# Patient Record
Sex: MALE | Race: WHITE | NOT HISPANIC OR LATINO | URBAN - METROPOLITAN AREA
[De-identification: names, ages, dates, MRNs, and addresses within clinical notes are randomized per-mention and may not be internally consistent; named-entity substitution may affect disease eponyms.]

---

## 2017-05-25 ENCOUNTER — EMERGENCY (EMERGENCY)
Facility: HOSPITAL | Age: 32
LOS: 0 days | Discharge: HOME | End: 2017-05-25
Attending: EMERGENCY MEDICINE | Admitting: EMERGENCY MEDICINE
Payer: OTHER MISCELLANEOUS

## 2017-05-25 VITALS
OXYGEN SATURATION: 100 % | HEIGHT: 72 IN | SYSTOLIC BLOOD PRESSURE: 132 MMHG | TEMPERATURE: 99 F | HEART RATE: 61 BPM | DIASTOLIC BLOOD PRESSURE: 72 MMHG | RESPIRATION RATE: 18 BRPM | WEIGHT: 250 LBS

## 2017-05-25 DIAGNOSIS — S00.211A ABRASION OF RIGHT EYELID AND PERIOCULAR AREA, INITIAL ENCOUNTER: ICD-10-CM

## 2017-05-25 DIAGNOSIS — S05.01XA INJURY OF CONJUNCTIVA AND CORNEAL ABRASION WITHOUT FOREIGN BODY, RIGHT EYE, INITIAL ENCOUNTER: ICD-10-CM
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
## 2017-05-25 PROCEDURE — 99282 EMERGENCY DEPT VISIT SF MDM: CPT

## 2017-05-25 RX ORDER — MOXIFLOXACIN HCL 0.5 %
1 DROPS OPHTHALMIC (EYE)
Qty: 1 | Refills: 0 | OUTPATIENT
Start: 2017-05-25 | End: 2017-06-01

## 2017-05-25 NOTE — ED STATDOCS - OBJECTIVE STATEMENT
32 y/o M with no PMHx presents to the ED c/o foreign body in R eye. Pt states that he was at work earlier today underneath truck and felt something fall to his face landing within his eye. Pt states that his eye has become red and now tearing. Pt currently calm, able to give adequate hx and denies any other acute c/o at this time.

## 2017-05-25 NOTE — ED STATDOCS - DETAILS:
I, Payam Mcginnis, performed the initial face to face bedside interview with this patient regarding history of present illness, review of symptoms and relevant past medical, social and family history.  I completed an independent physical examination.  I was the initial provider who evaluated this patient. I have signed out the follow up of any pending tests (i.e. labs, radiological studies) to the ACP.  I have communicated the patient’s plan of care and disposition with the ACP.  The history, relevant review of systems, past medical and surgical history, medical decision making, and physical examination was documented by the scribe in my presence and I attest to the accuracy of the documentation.

## 2017-05-25 NOTE — ED STATDOCS - PHYSICAL EXAMINATION
RIGHT EYE: +Small mid-center corneal abrasion noted right eye. No FB's found. EOMI. Fluorescene dye used. Tetracaine prep applied. Right eyelid lifted to check for FB-none found. RORY Olmedo

## 2017-05-25 NOTE — ED STATDOCS - MEDICAL DECISION MAKING DETAILS
Pt currently calm, able to give adequate hx, c/o R eye pain secondary to foreign body with plans to receive further eye exam.

## 2017-05-25 NOTE — ED STATDOCS - PROGRESS NOTE DETAILS
patient feeling much better, tests/labs reviewed. case discussed with attending. ok to dc home  RORY Olmedo

## 2017-05-25 NOTE — ED STATDOCS - NS ED MD SCRIBE ATTENDING SCRIBE SECTIONS
HISTORY OF PRESENT ILLNESS/PROGRESS NOTE/PHYSICAL EXAM/REVIEW OF SYSTEMS/DISPOSITION/RESULTS/PAST MEDICAL/SURGICAL/SOCIAL HISTORY

## 2019-04-24 PROBLEM — M25.512 LEFT SHOULDER PAIN: Status: ACTIVE | Noted: 2019-04-24

## 2019-04-25 ENCOUNTER — APPOINTMENT (OUTPATIENT)
Dept: ORTHOPEDIC SURGERY | Facility: CLINIC | Age: 34
End: 2019-04-25
Payer: OTHER MISCELLANEOUS

## 2019-04-25 VITALS
HEIGHT: 72 IN | HEART RATE: 52 BPM | TEMPERATURE: 98.3 F | SYSTOLIC BLOOD PRESSURE: 126 MMHG | DIASTOLIC BLOOD PRESSURE: 78 MMHG | WEIGHT: 230 LBS | BODY MASS INDEX: 31.15 KG/M2

## 2019-04-25 DIAGNOSIS — M75.22 BICIPITAL TENDINITIS, LEFT SHOULDER: ICD-10-CM

## 2019-04-25 DIAGNOSIS — M25.512 PAIN IN LEFT SHOULDER: ICD-10-CM

## 2019-04-25 DIAGNOSIS — Z78.9 OTHER SPECIFIED HEALTH STATUS: ICD-10-CM

## 2019-04-25 PROCEDURE — 99204 OFFICE O/P NEW MOD 45 MIN: CPT | Mod: 25

## 2019-04-25 PROCEDURE — 20610 DRAIN/INJ JOINT/BURSA W/O US: CPT | Mod: LT

## 2019-04-25 PROCEDURE — 73030 X-RAY EXAM OF SHOULDER: CPT | Mod: LT

## 2019-05-01 PROBLEM — Z78.9 NO PERTINENT PAST MEDICAL HISTORY: Status: ACTIVE | Noted: 2019-04-25

## 2019-05-01 NOTE — PROCEDURE
[de-identified] : Consent: \par At this time, I have recommended an injection to the left shoulder.  The risks and benefits of the procedure were discussed with the patient in detail.  Upon verbal consent of the patient, we proceeded with the injection as noted below.  \par \par Procedure:  \par After a sterile prep, the patient underwent an injection of 9 cc of 1% Lidocaine without epinephrine and 1 cc of Kenalog into the left shoulder.  The patient tolerated the procedure well.  There were no complications.

## 2019-05-01 NOTE — HISTORY OF PRESENT ILLNESS
[de-identified] : The patient comes in today with complaints referable to his left shoulder.  He states last Thursday he was on a pay  at work and he missed a step.  He states he pulled his left shoulder.  This injury is work related.  The patient states the pain is intermittent.  The patient describes the pain as dull and achy.  The patient states rest makes the pain better while certain arm movements makes the pain worse.

## 2019-05-01 NOTE — PHYSICAL EXAM
[de-identified] : Right Shoulder: \par Shoulder: Range of Motion in Degrees:   	\par    	                        Claimant:          Normal:  	\par Abduction (Active)  	180  	180 degrees  	\par Abduction (Passive)  	180  	180 degrees  	\par Forward elevation (Active):  	180  	180 degrees  	\par Forward elevation (Passive):  180  	180 degrees  	\par External rotation (Active):  	45  	45 degrees  	\par External rotation (Passive):  	45  	45 degrees  	\par Internal rotation (Active):  	L-1  	L-1  	\par Internal rotation (Passive):  	L-1  	L-1  	\par \par No motor weakness to internal rotation, external rotation or abduction in the scapular plane.  Negative crank test.  Negative O’Hayden’s test.  Negative Speed’s test. Negative Yergason’s test.  Negative cross arm test.  No tenderness to palpation at the AC joint. Negative Hawkin’s sign.  Negative Neer’s sign.  Negative apprehension. Negative sulcus sign.  No gross neurological or vascular deficits distally.  2+ radial and ulnar pulses.  Skin is intact.  No rashes, scars or lesions.  No extra-articular swelling or tenderness. \par \par Left Shoulder:  \par Shoulder: Range of Motion in Degrees:	\par 	                                  Claimant:	Normal:	\par Abduction (Active)	                  180	               180 degrees	\par Abduction (Passive)	  180	               180 degrees	\par Forward elevation (Active):	  180	               180 degrees	\par Forward elevation (Passive):	  180	               180 degrees	\par External rotation (Active):	   45	               45 degrees	\par External rotation (Passive):	   45	               45 degrees	\par Internal rotation (Active):	   L-1	               L-1	\par Internal rotation (Passive):	   L-1	               L-1	\par  \par No motor weakness to internal rotation, external rotation or abduction in the scapular plane.  Negative crank test.  Negative O’Hayden’s test.  Positive Speed’s test.  Positive Yergason’s test.  Negative cross arm test.  No tenderness to palpation at the AC joint. Positive Hawkin’s sign.  Positive Neer’s sign. Negative apprehension. Negative sulcus sign.  No gross neurological or vascular deficits distally.  Skin is intact.  No rashes, scars or lesions. 2+ radial and ulnar pulses. No extra-articular swelling or tenderness.\par  [de-identified] : Ambulating with a normal gait.  Station:  Normal.  [de-identified] : General Appearance:  Well-developed, well-nourished male in no acute distress.\par  [de-identified] : X-ray examination, two views of the left shoulder, reveals no acute fractures or dislocations.

## 2019-05-01 NOTE — DISCUSSION/SUMMARY
[de-identified] : The patient was given a cortisone injection for the left shoulder impingement and biceps tendinitis, as noted above.  He is advised to ice it and return to the office in a period of two weeks.\par \par The Workers' Compensation guidelines have been followed.

## 2019-05-01 NOTE — ADDENDUM
[FreeTextEntry1] : Dictated by Maddi Nur, OMEGAR/L, PA \par \par This note was written by Natacha Breen on 05/01/2019 acting as scribe for Dario Marcos III, MD\par

## 2019-05-13 ENCOUNTER — APPOINTMENT (OUTPATIENT)
Dept: ORTHOPEDIC SURGERY | Facility: CLINIC | Age: 34
End: 2019-05-13
Payer: OTHER MISCELLANEOUS

## 2019-05-13 VITALS
HEIGHT: 72 IN | BODY MASS INDEX: 31.15 KG/M2 | DIASTOLIC BLOOD PRESSURE: 73 MMHG | HEART RATE: 56 BPM | SYSTOLIC BLOOD PRESSURE: 122 MMHG | TEMPERATURE: 98.4 F | WEIGHT: 230 LBS

## 2019-05-13 DIAGNOSIS — M75.42 IMPINGEMENT SYNDROME OF LEFT SHOULDER: ICD-10-CM

## 2019-05-13 PROCEDURE — 99212 OFFICE O/P EST SF 10 MIN: CPT

## 2019-05-15 NOTE — HISTORY OF PRESENT ILLNESS
[de-identified] : The patient comes in today for his left shoulder. He states he feels markedly improved. He has minimal tenderness.

## 2019-05-15 NOTE — ADDENDUM
[FreeTextEntry1] : This note was written by Sera Quintero on 05/15/2019 acting as a scribe for JOANA ORDOÑEZ

## 2019-05-15 NOTE — DISCUSSION/SUMMARY
[de-identified] : At this time, since the patient is doing well with impingement syndrome of left shoulder, he will return to full activities.  He will follow up on an as needed basis.\par \par The Workers' Compensation guidelines have been followed.\par \par

## 2019-05-15 NOTE — REASON FOR VISIT
[Worker's Compensation] : This visit is related to worker's compensation [Follow-Up Visit] : a follow-up visit for [FreeTextEntry2] : his left shoulder

## 2019-05-15 NOTE — PHYSICAL EXAM
[Normal] : Gait: normal [de-identified] : Appearance:  Well-developed, well-nourished male in no acute distress.\par \par   [de-identified] : Left Shoulder: \par Range of Motion in Degrees:   	\par    	                        Claimant:  	Normal:  	\par Abduction (Active)  	180  	180 degrees  	\par Abduction (Passive)  	180  	180 degrees  	\par Forward elevation (Active):  	180  	180 degrees  	\par Forward elevation (Passive):  180  	180 degrees  	\par External rotation (Active):  	45  	45 degrees  	\par External rotation (Passive):  	45  	45 degrees  	\par Internal rotation (Active):  	L-1  	L-1  	\par Internal rotation (Passive):  	L-1  	L-1  	\par No motor weakness to internal rotation, external rotation or abduction in the scapular plane.  Negative crank test.  Negative O’Hayden’s test.  Negative Speed’s test. Negative Yergason’s test.  Negative cross arm test.  No tenderness to palpation at the AC joint. Negative Hawkin’s sign.  Negative Neer’s sign.  Negative apprehension. Negative sulcus sign.  No gross neurological or vascular deficits distally.  Skin is intact.  No rashes, scars or lesions. radial and ulnar pulses. No extra-articular swelling or tenderness.\par   [de-identified] : .

## 2022-08-04 ENCOUNTER — NON-APPOINTMENT (OUTPATIENT)
Age: 37
End: 2022-08-04

## 2023-04-25 ENCOUNTER — NON-APPOINTMENT (OUTPATIENT)
Age: 38
End: 2023-04-25

## 2023-05-26 ENCOUNTER — EMERGENCY (EMERGENCY)
Facility: HOSPITAL | Age: 38
LOS: 0 days | Discharge: HOME | End: 2023-05-26
Attending: EMERGENCY MEDICINE
Payer: COMMERCIAL

## 2023-05-26 VITALS
HEART RATE: 80 BPM | OXYGEN SATURATION: 98 % | HEIGHT: 72 IN | DIASTOLIC BLOOD PRESSURE: 80 MMHG | SYSTOLIC BLOOD PRESSURE: 117 MMHG | RESPIRATION RATE: 18 BRPM | TEMPERATURE: 98 F | WEIGHT: 244.93 LBS
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
## 2023-05-26 DIAGNOSIS — S86.812A STRAIN OF OTHER MUSCLE(S) AND TENDON(S) AT LOWER LEG LEVEL, LEFT LEG, INITIAL ENCOUNTER: ICD-10-CM

## 2023-05-26 DIAGNOSIS — M79.662 PAIN IN LEFT LOWER LEG: ICD-10-CM
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
## 2023-05-26 PROCEDURE — 99284 EMERGENCY DEPT VISIT MOD MDM: CPT

## 2023-05-26 PROCEDURE — 99283 EMERGENCY DEPT VISIT LOW MDM: CPT

## 2023-05-26 PROCEDURE — 99053 MED SERV 10PM-8AM 24 HR FAC: CPT

## 2023-05-26 RX ORDER — IBUPROFEN 200 MG
600 TABLET ORAL ONCE
Refills: 0 | Status: COMPLETED | OUTPATIENT
Start: 2023-05-26 | End: 2023-05-26

## 2023-05-26 RX ORDER — OXYCODONE HYDROCHLORIDE 5 MG/1
5 TABLET ORAL ONCE
Refills: 0 | Status: DISCONTINUED | OUTPATIENT
Start: 2023-05-26 | End: 2023-05-26

## 2023-05-26 RX ADMIN — Medication 600 MILLIGRAM(S): at 23:28

## 2023-05-26 RX ADMIN — OXYCODONE HYDROCHLORIDE 5 MILLIGRAM(S): 5 TABLET ORAL at 23:28
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
## 2023-05-26 NOTE — ED STATDOCS - CARE PROVIDER_API CALL
Jonel Hernandez [place]  Orthopaedic Surgery  [address] [place] [id number]  Phone: [phone]  Fax: [phone]  Follow Up Time:
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
## 2023-05-26 NOTE — ED ADULT NURSE NOTE - NSFALLUNIVINTERV_ED_ALL_ED
Bed/Stretcher in lowest position, wheels locked, appropriate side rails in place/Call bell, personal items and telephone in reach/Instruct patient to call for assistance before getting out of bed/chair/stretcher/Non-slip footwear applied when patient is off stretcher/[place] to call system/Physically safe environment - no spills, clutter or unnecessary equipment/Purposeful proactive rounding/Room/bathroom lighting operational, light cord in reach

## 2023-05-26 NOTE — ED ADULT NURSE REASSESSMENT NOTE - NS ED NURSE REASSESS COMMENT FT1
instructed on how to use crutches at the time of discharge. Verbalize and demonstrate back with good understanding. Pt discharged in stable condition.

## 2023-05-26 NOTE — ED STATDOCS - CLINICAL SUMMARY MEDICAL DECISION MAKING FREE TEXT BOX
No signs of bony injury or achilles injury.  Likely calf muscle strain.  Will treat with supportive care, RICE therapy, crutches, f/u with orthopedics.  Return precautions given.

## 2023-05-26 NOTE — ED STATDOCS - MUSCULOSKELETAL, MLM
TTP left calf, no swelling, no bony deformity, Achilles tendon palpated, no deformity, normal plantar flexion, NVI

## 2023-05-26 NOTE — ED STATDOCS - PATIENT PORTAL LINK FT
You can access the FollowMyHealth Patient Portal offered by [facility] by registering at the following website: [email or web address]. By joining [facility]’s FollowMyHealth portal, you will also be able to view your health information using other applications (apps) compatible with our system.

## 2023-05-26 NOTE — ED STATDOCS - NSFOLLOWUPINSTRUCTIONS_ED_ALL_ED_FT
Medial Head Gastrocnemius Tear  Outline of the leg showing the gastrocnemius muscle.  Medial head gastrocnemius tear, also called tennis leg, is an injury to the inner (medial) part of the calf muscle. This injury may include overstretching of the muscle or a partial or complete tear. This is a common sports injury. Calf muscle tears usually occur near the back of the knee. This often causes sudden pain and muscle weakness.    What are the causes?  This condition is caused by forceful stretching or strain on the calf muscle. This usually happens when you forcefully push off of your foot. It may also happen if you forcefully straighten your knee while your foot is flat on the ground.    What increases the risk?  The following factors may make you more likely to develop this condition:  Being male and older than age 40.  Playing sports that involve:  Quick increases in speed and changes of direction, such as in tennis and soccer.  Jumping.  Running, especially uphill or on uneven ground.  What are the signs or symptoms?  Symptoms of this condition include:  Sudden pain in the back of the leg. You may hear a noise, like a pop or a snap at the time of injury.  Pain that gets worse when you bring your toes up or when you straighten your knee.  Pain on the inside of your calf, from your knee to your ankle.  Pain when pressing on your calf muscle.  Swelling and bruising along your calf and lower leg, down to your ankle. This may worsen for the first 2 days before getting better.  Not being able to rise up on your toes, or pain when doing so.  Difficulty pushing off your foot when walking or using stairs.  How is this diagnosed?  This condition may be diagnosed based on:  Your symptoms and medical history.  A physical exam. Your health care provider may be able to feel a lump or a defect in your muscle.  An MRI or ultrasound to determine the severity and exact location of your injury.  How is this treated?  Treatment for this condition may include:  Resting the muscle and keeping weight off your leg for several days. During this time, you may use crutches or another walking device.  Using a splint to keep your ankle or knee in a stable position.  Wearing a walking boot to decrease the use of your gastrocnemius muscle.  Using a wedge under your heel to reduce stretching of your healing muscle.  Wearing a compression sleeve around your calf muscle.  Taking medicine for pain and swelling, such as NSAIDs or steroids.  Taking medicine for muscle spasms.  Follow these instructions at home:  Medicines    Take over-the-counter and prescription medicines only as told by your health care provider.  Ask your health care provider if the medicine prescribed to you requires you to avoid driving or using heavy machinery.  Talk with your health care provider before you take any medicines that contain aspirin. Aspirin increases your risk for bleeding at the injured area.  If you have a removable splint, boot, or compression sleeve:    Wear it as told by your health care provider. Remove it only as told by your health care provider.  Check the skin around it every day. Tell your health care provider about any concerns.  Loosen the splint, boot, or sleeve if your toes tingle, become numb, or turn cold and blue.  Keep the splint, boot, or sleeve clean and dry.  If the splint, boot, or sleeve is not waterproof:  Do not let it get wet.  Cover it with a watertight covering when you take a bath or shower.  Managing pain, stiffness, and swelling    A bag of ice on a towel on the skin.  If directed, put ice on the injured area.  If you have a removable splint, boot, or sleeve, remove it as told by your health care provider.  Put ice in a plastic bag.  Place a towel between your skin and the bag.  Leave the ice on for 20 minutes, 2–3 times a day.  Remove the ice if your skin turns bright red. This is very important. If you cannot feel pain, heat, or cold, you have a greater risk of damage to the area.  Move your toes often to reduce stiffness and swelling.  Elevate the injured area above the level of your heart while you are sitting or lying down.  Activity    Return to your normal activities as told by your health care provider. Ask your health care provider what activities are safe for you.  Do not use the injured limb to support your body weight until your health care provider says that you can. Use crutches as told by your health care provider.  Do leg exercises as told by your health care provider or physical therapist.  Return to sporting activity gradually and only as told by your health care provider or physical therapist. Full recovery may take several months.  General instructions    Ask your health care provider when it is safe to drive.  Do not use any products that contain nicotine or tobacco. These products include cigarettes, chewing tobacco, and vaping devices, such as e-cigarettes. If you need help quitting, ask your health care provider.  Keep all follow-up visits. This is important.  How is this prevented?  Warm up and stretch before being active.  Cool down and stretch after being active.  Give your body time to rest between periods of activity.  Maintain physical fitness, including:  Strength.  Flexibility.  Contact a health care provider if:  Your symptoms do not improve with rest and treatment.  Get help right away if:  You have swelling or redness in your calf that is getting worse.  Your skin or toenails turn blue or gray, feel cold, or become numb.  Summary  Medial head gastrocnemius tear, also called tennis leg, is an injury to the inner part of the calf muscle.  Follow instructions as told by your health care provider for resting, icing, compressing, and elevating your leg.  Take over-the-counter and prescription medicines only as told by your health care provider.  Contact a health care provider if your symptoms do not improve with rest and treatment.  This information is not intended to replace advice given to you by your health care provider. Make sure you discuss any questions you have with your health care provider.    Document Revised: 05/19/2022 Document Reviewed: 05/19/2022

## 2023-06-07 ENCOUNTER — APPOINTMENT (OUTPATIENT)
Dept: ORTHOPEDIC SURGERY | Facility: CLINIC | Age: 38
End: 2023-06-07

## 2023-06-23 ENCOUNTER — APPOINTMENT (OUTPATIENT)
Dept: ORTHOPEDIC SURGERY | Facility: CLINIC | Age: 38
End: 2023-06-23

## 2024-02-15 ENCOUNTER — NON-APPOINTMENT (OUTPATIENT)
Age: 39
End: 2024-02-15

## 2024-02-23 NOTE — ED ADULT NURSE NOTE - INTEGUMENTARY WDL
To whom it may concern:    Jason Chong was under my care at [facility] 2/20- 2/23/2024.  He may return to work without restrictions      Sincerely,      Dr Osbaldo Cordova   Color consistent with ethnicity/race, warm, dry intact, resilient.

## 2025-07-30 ENCOUNTER — NON-APPOINTMENT (OUTPATIENT)
Age: 40
End: 2025-07-30